# Patient Record
Sex: MALE | Race: AMERICAN INDIAN OR ALASKA NATIVE
[De-identification: names, ages, dates, MRNs, and addresses within clinical notes are randomized per-mention and may not be internally consistent; named-entity substitution may affect disease eponyms.]

---

## 2018-05-22 ENCOUNTER — HOSPITAL ENCOUNTER (EMERGENCY)
Dept: HOSPITAL 5 - ED | Age: 18
Discharge: LEFT BEFORE DISCHARGE | End: 2018-05-22
Payer: SELF-PAY

## 2018-05-22 VITALS — SYSTOLIC BLOOD PRESSURE: 115 MMHG | DIASTOLIC BLOOD PRESSURE: 80 MMHG

## 2018-05-22 DIAGNOSIS — Z53.21: ICD-10-CM

## 2018-05-22 DIAGNOSIS — R58: Primary | ICD-10-CM

## 2018-07-07 ENCOUNTER — HOSPITAL ENCOUNTER (EMERGENCY)
Dept: HOSPITAL 5 - ED | Age: 18
Discharge: HOME | End: 2018-07-07
Payer: SELF-PAY

## 2018-07-07 VITALS — DIASTOLIC BLOOD PRESSURE: 85 MMHG | SYSTOLIC BLOOD PRESSURE: 126 MMHG

## 2018-07-07 DIAGNOSIS — J01.10: Primary | ICD-10-CM

## 2018-07-07 PROCEDURE — 71046 X-RAY EXAM CHEST 2 VIEWS: CPT

## 2018-07-07 PROCEDURE — 99283 EMERGENCY DEPT VISIT LOW MDM: CPT

## 2018-07-07 NOTE — EMERGENCY DEPARTMENT REPORT
ED ENT HPI





- General


Chief complaint: Upper Respiratory Infection


Stated complaint: FLU LIKE SX


Time Seen by Provider: 18 04:21


Source: patient


Mode of arrival: Ambulatory


Limitations: No Limitations





- History of Present Illness


Initial comments: 


17-year-old male past medical history none presents with complaint of 2 weeks 

of persistent nasal congestion and sneezing intermittent headaches and slight 

malaise.  Patient is awake alert and oriented 3 fully lucid not in acute 

distress.  Denies current fevers or chills.  Denies any bloody cough.  Denies 

any recent travel.  Denies chest pain palpitations or significant shortness of 

breath.  States he was taking DayQuil NyQuil combination with minimal relief of 

his symptoms.  Has not used any other medicines other than Motrin.  Speaking in 

full sentences no audible wheezing or stridor.


MD complaint: other


Onset/Timin


-: week(s)


Location: nose


Severity: moderate


Severity scale (0 -10): 6


Quality: aching


Consistency: constant





- Related Data


 Previous Rx's











 Medication  Instructions  Recorded  Last Taken  Type


 


Amoxicillin/K Clav Tab [Augmentin 1 each PO ONCE #14 tablet 18 Unknown Rx





875MG TAB]    


 


Fluticasone [Flonase] 1 spray NS QDAY PRN #1 bottle 18 Unknown Rx


 


Ibuprofen [Motrin] 800 mg PO Q8HR PRN #20 tablet 18 Unknown Rx


 


Loratadine [Claritin] 10 mg PO ONCE PRN #30 tablet 18 Unknown Rx


 


Phenylephrine/Dm/Acetaminop/GG 10 ml PO Q6H PRN #1 liquid 18 Unknown Rx





[Mucinex Cold-Flu-Sorethroat Lq]    











 Allergies











Allergy/AdvReac Type Severity Reaction Status Date / Time


 


No Known Allergies Allergy   Unverified 18 02:42














ED Dental HPI





- General


Chief complaint: Upper Respiratory Infection


Stated complaint: FLU LIKE SX


Time Seen by Provider: 18 04:21


Source: patient


Mode of arrival: Ambulatory


Limitations: No Limitations





- Related Data


 Previous Rx's











 Medication  Instructions  Recorded  Last Taken  Type


 


Amoxicillin/K Clav Tab [Augmentin 1 each PO ONCE #14 tablet 18 Unknown Rx





875MG TAB]    


 


Fluticasone [Flonase] 1 spray NS QDAY PRN #1 bottle 18 Unknown Rx


 


Ibuprofen [Motrin] 800 mg PO Q8HR PRN #20 tablet 18 Unknown Rx


 


Loratadine [Claritin] 10 mg PO ONCE PRN #30 tablet 18 Unknown Rx


 


Phenylephrine/Dm/Acetaminop/GG 10 ml PO Q6H PRN #1 liquid 18 Unknown Rx





[Mucinex Cold-Flu-Sorethroat Lq]    











 Allergies











Allergy/AdvReac Type Severity Reaction Status Date / Time


 


No Known Allergies Allergy   Unverified 18 02:42














ED Review of Systems


ROS: 


Stated complaint: FLU LIKE SX


Other details as noted in HPI





Constitutional: denies: chills, fever


Eyes: denies: eye pain, eye discharge, vision change


ENT: congestion.  denies: ear pain, throat pain


Respiratory: denies: cough, shortness of breath, wheezing


Cardiovascular: denies: chest pain, palpitations


Endocrine: no symptoms reported


Gastrointestinal: denies: abdominal pain, nausea, diarrhea


Genitourinary: denies: urgency, dysuria


Musculoskeletal: denies: back pain, joint swelling, arthralgia


Skin: denies: rash, lesions


Neurological: denies: headache, weakness, paresthesias


Psychiatric: denies: anxiety, depression


Hematological/Lymphatic: denies: easy bleeding, easy bruising





ED Past Medical Hx





- Past Medical History


Previous Medical History?: No


Additional medical history: Keloids





- Surgical History


Past Surgical History?: No





- Social History


Smoking Status: Never Smoker


Substance Use Type: None





- Medications


Home Medications: 


 Home Medications











 Medication  Instructions  Recorded  Confirmed  Last Taken  Type


 


Amoxicillin/K Clav Tab [Augmentin 1 each PO ONCE #14 tablet 18  Unknown Rx





875MG TAB]     


 


Fluticasone [Flonase] 1 spray NS QDAY PRN #1 bottle 18  Unknown Rx


 


Ibuprofen [Motrin] 800 mg PO Q8HR PRN #20 tablet 18  Unknown Rx


 


Loratadine [Claritin] 10 mg PO ONCE PRN #30 tablet 18  Unknown Rx


 


Phenylephrine/Dm/Acetaminop/GG 10 ml PO Q6H PRN #1 liquid 18  Unknown Rx





[Mucinex Cold-Flu-Sorethroat Lq]     














ED Physical Exam





- General


Limitations: No Limitations


General appearance: alert, in no apparent distress





- Head


Head exam: Present: atraumatic, normocephalic





- Eye


Eye exam: Present: normal appearance, PERRL, EOMI





- ENT


ENT exam: Present: mucous membranes moist, other





- Expanded ENT Exam


  ** Expanded


Ear exam: Present: other (patient has nasal congestion on exam and some 

tenderness to palpation of sinuses)





- Neck


Neck exam: Present: normal inspection





- Respiratory


Respiratory exam: Present: normal lung sounds bilaterally.  Absent: respiratory 

distress





- Cardiovascular


Cardiovascular Exam: Present: regular rate, normal rhythm.  Absent: systolic 

murmur, diastolic murmur, rubs, gallop





- GI/Abdominal


GI/Abdominal exam: Present: soft, normal bowel sounds





- Rectal


Rectal exam: Present: deferred





- Extremities Exam


Extremities exam: Present: normal inspection





- Back Exam


Back exam: Present: normal inspection





- Neurological Exam


Neurological exam: Present: alert, oriented X3





- Psychiatric


Psychiatric exam: Present: normal affect, normal mood





- Skin


Skin exam: Present: warm, dry, intact, normal color.  Absent: rash





ED Course





 Vital Signs











  18





  02:15


 


Temperature 98.2 F


 


Pulse Rate 59 L


 


Respiratory 16





Rate 


 


Blood Pressure 126/85


 


O2 Sat by Pulse 100





Oximetry 














ED Medical Decision Making





- Medical Decision Making


A/P: Acute sinusitis


1- Based on clinical exam and symptoms and ongoing symptoms for 2 weeks will 

treat patient empirically for sinusitis


2- course of Augmentin twice a day 7 days, Motrin when necessary, Mucinex, 

Claritin, Flonase, throat lozenges


3- CXR is unremarkable, vital signs are stable patient is afebrile


4- follow-up with primary care


Critical care attestation.: 


If time is entered above; I have spent that time in minutes in the direct care 

of this critically ill patient, excluding procedure time.








ED Disposition


Clinical Impression: 


Sinusitis, acute


Qualifiers:


 Sinusitis location: frontal Recurrence: non-recurrent Qualified Code(s): 

J01.10 - Acute frontal sinusitis, unspecified





Disposition: DC-01 TO HOME OR SELFCARE


Is pt being admited?: No


Does the pt Need Aspirin: No


Condition: Stable


Instructions:  Sinusitis (ED)


Prescriptions: 


Amoxicillin/K Clav Tab [Augmentin 875MG TAB] 1 each PO ONCE #14 tablet


Fluticasone [Flonase] 1 spray NS QDAY PRN #1 bottle


 PRN Reason: Congestion


Ibuprofen [Motrin] 800 mg PO Q8HR PRN #20 tablet


 PRN Reason: Pain , Severe (7-10)


Loratadine [Claritin] 10 mg PO ONCE PRN #30 tablet


 PRN Reason: Congestion


Phenylephrine/Dm/Acetaminop/GG [Mucinex Cold-Flu-Sorethroat Lq] 10 ml PO Q6H 

PRN #1 liquid


 PRN Reason: Congestion


Referrals: 


[facility] [Provider Group] - 3-5 Days


[facility] [Provider Group] - 3-5 Days


Forms:  Work/School Release Form(ED)


Time of Disposition: 04:44

## 2018-07-07 NOTE — XRAY REPORT
FINAL REPORT



PROCEDURE:  XR CHEST ROUTINE 2V



TECHNIQUE:  PA and lateral chest radiographs were obtained. CPT

14450







HISTORY:  cough and congestion 



COMPARISON:  No prior studies are available for comparison.



FINDINGS:  

Heart: Normal.



Mediastinum/Vessels: Normal.



Lungs/Pleural space: Normal.



Bony thorax: No acute osseous abnormality.



Other: 



IMPRESSION:  

Normal examination.

## 2018-07-29 ENCOUNTER — HOSPITAL ENCOUNTER (EMERGENCY)
Dept: HOSPITAL 5 - ED | Age: 18
Discharge: HOME | End: 2018-07-29
Payer: COMMERCIAL

## 2018-07-29 VITALS — SYSTOLIC BLOOD PRESSURE: 117 MMHG | DIASTOLIC BLOOD PRESSURE: 76 MMHG

## 2018-07-29 DIAGNOSIS — F17.200: ICD-10-CM

## 2018-07-29 DIAGNOSIS — G51.0: Primary | ICD-10-CM

## 2018-07-29 DIAGNOSIS — L91.0: ICD-10-CM

## 2018-07-29 PROCEDURE — 96374 THER/PROPH/DIAG INJ IV PUSH: CPT

## 2018-07-29 PROCEDURE — 99284 EMERGENCY DEPT VISIT MOD MDM: CPT

## 2018-07-29 PROCEDURE — 70450 CT HEAD/BRAIN W/O DYE: CPT

## 2018-07-29 NOTE — EMERGENCY DEPARTMENT REPORT
ED Neuro Deficit HPI





- General


Chief Complaint: Eye Problems


Stated Complaint: NUM ON LFT SIDE/PINK EYE


Time Seen by Provider: 18 13:08


Source: patient


Mode of arrival: Ambulatory


Limitations: No Limitations





- History of Present Illness


Initial Comments: 





This is a 17-year-old male presented to the emergency room reported that his 

left eye is watery and red since yesterday.  He is reporting facial numbness 

and inability to open his mouth because of facial numbness.  He denies any 

headache, blurred vision, dizziness or numbness or tingling to extremities.  

Denies any chest pain or shortness of breath.  Pain is 0 out of 10.  Denies any 

trauma


Onset/Timin


-: days(s)


Location: left face


Presenting Symptoms: Present: Facial Droop/Numbness


History of same: No


Place: home


Severity: moderate


Quality: numb, constant


Improves With: none


Worsens With: none


On Anticoagulants: No


Context: sudden onset


Associated Symptoms: denies: confusion, chest pain, cough, diaphoresis, fever/

chills, headaches, loss of appetite, malise, nausea/vomiting, vertigo, seizures

, shortness of breath, syncope, weakness


Treatments Prior to Arrival: none





- Related Data


Home Medications: 


 Previous Rx's











 Medication  Instructions  Recorded  Last Taken  Type


 


Amoxicillin/K Clav Tab [Augmentin 1 tab PO Q12HR #14 tab 18 Unknown Rx





875 mg]    


 


Fluticasone [Flonase] 1 spray NS QDAY PRN #1 bottle 18 Unknown Rx


 


Ibuprofen [Motrin] 800 mg PO Q8HR PRN #20 tablet 18 Unknown Rx


 


Loratadine [Claritin] 10 mg PO ONCE PRN #30 tablet 18 Unknown Rx


 


Phenylephrine/Dm/Acetaminop/GG 10 ml PO Q6H PRN #1 liquid 18 Unknown Rx





[Mucinex Cold-Flu-Sorethroat Lq]    


 


Polyvinyl Alcohol [Artificial 2 drop OS Q2H #15 drops 18 Unknown Rx





Tears]    


 


Prednisone [predniSONE 10 mg 10 mg PO .TAPER #1 tab.ds.pk 18 Unknown Rx





(6-Day Pack, 21 Tabs)]    


 


Valacyclovir HCl [Valtrex] 1,000 mg PO TID 7 Days #21 tablet 18 Unknown Rx











Allergies/Adverse Reactions: 


 Allergies











Allergy/AdvReac Type Severity Reaction Status Date / Time


 


No Known Allergies Allergy   Unverified 18 02:42














ED Review of Systems


ROS: 


Stated complaint: NUM ON LFT SIDE/PINK EYE


Other details as noted in HPI





Constitutional: denies: chills, fever


Eyes: other (unable to close left eye fully.  Increased tearing to the left eye)

.  denies: eye pain, eye discharge, vision change


ENT: other (facial numbness).  denies: throat pain


Respiratory: denies: cough, shortness of breath, SOB with exertion, SOB at rest

, stridor, wheezing


Cardiovascular: denies: chest pain, palpitations, dyspnea on exertion


Gastrointestinal: denies: abdominal pain, nausea, vomiting, diarrhea


Genitourinary: denies: urgency, dysuria


Musculoskeletal: denies: back pain, joint swelling, arthralgia, myalgia


Skin: denies: rash, lesions


Neurological: numbness.  denies: headache, weakness, paresthesias, confusion, 

abnormal gait, vertigo





ED Past Medical Hx





- Past Medical History


Previous Medical History?: No


Additional medical history: Keloids





- Surgical History


Past Surgical History?: No





- Family History


Family history: no significant





- Social History


Smoking Status: Current Every Day Smoker


Substance Use Type: None





- Medications


Home Medications: 


 Home Medications











 Medication  Instructions  Recorded  Confirmed  Last Taken  Type


 


Amoxicillin/K Clav Tab [Augmentin 1 tab PO Q12HR #14 tab 18  Unknown Rx





875 mg]     


 


Fluticasone [Flonase] 1 spray NS QDAY PRN #1 bottle 18  Unknown Rx


 


Ibuprofen [Motrin] 800 mg PO Q8HR PRN #20 tablet 18  Unknown Rx


 


Loratadine [Claritin] 10 mg PO ONCE PRN #30 tablet 18  Unknown Rx


 


Phenylephrine/Dm/Acetaminop/GG 10 ml PO Q6H PRN #1 liquid 18  Unknown Rx





[Mucinex Cold-Flu-Sorethroat Lq]     


 


Polyvinyl Alcohol [Artificial 2 drop OS Q2H #15 drops 18  Unknown Rx





Tears]     


 


Prednisone [predniSONE 10 mg 10 mg PO .TAPER #1 tab.ds.pk 18  Unknown Rx





(6-Day Pack, 21 Tabs)]     


 


Valacyclovir HCl [Valtrex] 1,000 mg PO TID 7 Days #21 tablet 18  Unknown 

Rx














ED Neuro Physical Exam





- General


Limitations: No Limitations


General appearance: alert, in no apparent distress


Suspected Stroke: No





- Head


Head exam: Present: atraumatic, normocephalic, normal inspection, other (normal 

exam)





- Eye


Eye exam: Present: normal appearance, PERRL, EOMI.  Absent: periorbital swelling

, periorbital tenderness


Pupils: Present: normal accommodation





- Expanded Eye Exam


  ** Expanded


Eyelids: Normal Inspection: Right (left eyelid droop.)


Pupils: Regular, Round: Bilateral, Reactive: Bilateral


Sclera/Conjunctival: Normal Inspection: Bilateral (increased tearing to left eye

)


Visual acuity (R) = 20/: 15 (20/15 all around)


Visual acuity (L) = 20/: 15





- ENT


ENT exam: Present: normal exam, normal orophraynx, mucous membranes moist, TM's 

normal bilaterally, normal external ear exam





- Neck


Neck exam: Present: normal inspection, tenderness, meningismus, full ROM, other 

(no C-spine tenderness).  Absent: lymphadenopathy





- Respiratory


Respiratory exam: Present: normal lung sounds bilaterally.  Absent: respiratory 

distress, chest wall tenderness





- Cardiovascular


Cardiovascular Exam: Present: regular rate, normal rhythm, normal heart sounds.

  Absent: systolic murmur, diastolic murmur





- GI/Abdominal


GI/Abdominal exam: Present: soft, normal bowel sounds.  Absent: distended, 

tenderness, rigid





- Extremities Exam


Extremities exam: Present: normal inspection, full ROM, normal capillary refill

, other (No cce. + 2 pulses in all extremities, no neurovascular compromise).  

Absent: tenderness, pedal edema, joint swelling, calf tenderness





- Back Exam


Back exam: Present: normal inspection, full ROM, other (ambulates without any 

difficulties).  Absent: tenderness, CVA tenderness (R), CVA tenderness (L), 

muscle spasm, paraspinal tenderness, vertebral tenderness, rash noted





- Neurological Exam


Neurological exam: Present: alert, oriented X3, normal gait, reflexes normal, 

other (left facial paralysis.  Left eyebrow sagging, inability to close left eye

, ).  Absent: motor sensory deficit (left facial muscle paralysis)





- NIHSS


5a. Motor Arm Left: no drift


6a. Motor Leg Left: no drift





- Psychiatric


Psychiatric exam: Present: normal affect, normal mood





- Skin


Skin exam: Present: warm, dry, intact, normal color.  Absent: rash





ED Course


 Vital Signs











  18





  12:00


 


Temperature 98.2 F


 


Pulse Rate 74


 


Respiratory 16





Rate 


 


Blood Pressure 123/72


 


O2 Sat by Pulse 100





Oximetry 














- Reevaluation(s)


Reevaluation #1: 





18 15:16


Patient given Timentin 125 mg IV and acyclovir 800 mg.  Emergency room





- Radiology Data


Radiology results: report reviewed


CT scan of the head and brain without contrast dictated radiologist report 

reviewed by myself.  No acute findings. See report below.





Patient: KWESI STREETER MR#: [id number] 


: 2000 Acct:[id number] 


Age/Sex: 17 / M ADM Date: 18 


Loc: ED 


Attending Dr: 








Ordering Physician: JOSE MANUEL PACHECO 


Date of Service: 18 


Procedure(s): CT head/brain wo con 


Accession Number(s): [id number] 





cc: JOSE MANUEL PACHECO 








FINAL REPORT 





PROCEDURE: CT HEAD/BRAIN WO CON 





TECHNIQUE: Computerized tomography of the head was performed 


without contrast material. 





HISTORY: facial drooping, 





COMPARISON: No prior studies are available for comparison. 





FINDINGS: 


Brain: Brain density appears normal. No evidence of intracranial 


hemorrhage. No parenchymal hemorrhage, mass lesions or mass 


effect are seen. No abnormal extraxial fluid collects or masses 


are seen. 





Ventricles: Ventricles are normal size and are midline. 





Bone Windows: No evidence of skull fracture. 





Paranasal sinuses: Visualized portions are clear. 





Mastoid air cells: Clear 





IMPRESSION: 


Negative exam. 











Transcribed By: DFN 


Dictated By: SIMBA LUTZ MD 


Electronically Authenticated By: SIMBA LUTZ MD 


Signed Date/Time: 18 











DD/DT: 18 


TD/TT: 18





- Medical Decision Making





This is a 17-year-old male who presents to the Hospital complaining of left 

face droop.  With his lips slanted in and increased tearing to the left eye 

with inability to close left eye.  He is reporting numbness to the left side of 

his face with an inability to move left side of face.  Denies any injury or any 

previous incident.





She was seen by myself and also by Dr. Chavez.  I examined patient and he was 

found to have left facial nerve paralysis, left eyebrow droop in, inability to 

close left eye, droop into the left side of his mouth and increased tearing to 

left eye.  He has no pain.  Physical findings for peripheral nerve involvement.

  Patient had CT scan of the brain and head without contrast that shows normal 

exam.  CVA ruled out, otitis media ruled out.  I discussed diagnosis and 

treatment plan the patient and he voiced understanding.  Patient was given 

steroids and antiviral in emergency room with no adverse reaction.  I discussed 

with him he needs to follow up with primary care physician and neurologist 

within 1-2 days for follow-up Bell's palsy.





Assessment/plan





Bell's palsy-CT scan of the head revealed no acute findings.  Patient was given 

acyclovir 800 mg by mouth and Solu-Medrol 125 mg IV.  He will be discharged 

home on Zovirax and prednisone Dosepak,artificial tears





I educated patient on diagnosis, CT scan results, medication, allergies are 

given on Bell's palsy and need to follow-up and he voiced understanding.





Patient discharged home in stable condition, vital signs are stable he is 

afebrile and is nontoxic in appearance.  Discharged home to follow-up with his 

primary care and neurology in 1-2 days and he voiced understanding.  

Prescription given for Zovirax and prednisone Dosepak, artificial tears





- Differential Diagnosis


intracranial abnormality, viral infection, OM,Bell's palsy





- Thrombolytic Inclusion/Exclusion


Thrombolytic Exclusion Criteria: Symptom Onset > 3 Hours


Critical care attestation.: 


If time is entered above; I have spent that time in minutes in the direct care 

of this critically ill patient, excluding procedure time.








ED Disposition


Clinical Impression: 


 Facial paralysis/[place] palsy





Disposition: DC-01 TO HOME OR SELFCARE


Is pt being admited?: No


Does the pt Need Aspirin: No


Condition: Stable


Instructions:  Xiong Palsy (ED), Eye Lubricant (Into the eye)


Additional Instructions: 


Please apply artificial tears to left eye every 2 hours while awake.  This will 

prevent drying out of left eye


Please follow up with neurologist and primary care in 1-2 days as instructed


take medication as instructed


If Condition and symptoms worsens please return to the emergency room


The patient education given to you on Bell's palsy


Prescriptions: 


Polyvinyl Alcohol [Artificial Tears] 2 drop OS Q2H #15 drops


Prednisone [predniSONE 10 mg (6-Day Pack, 21 Tabs)] 10 mg PO .TAPER #1 tab.ds.pk


Valacyclovir HCl [Valtrex] 1,000 mg PO TID 7 Days #21 tablet


Referrals: 


PRIMARY CARE,MD [Primary Care Provider] - 18


[facility] [Outside] - 18


BRIDGET MARIE MD [Staff Physician] - 2-3 Days


Forms:  Work/School Release Form(ED)

## 2018-07-29 NOTE — CAT SCAN REPORT
FINAL REPORT



PROCEDURE:  CT HEAD/BRAIN WO CON



TECHNIQUE:  Computerized tomography of the head was performed

without contrast material. 



HISTORY:  facial drooping, 



COMPARISON:  No prior studies are available for comparison.



FINDINGS:  

Brain: Brain density appears normal.  No evidence of intracranial

hemorrhage.  No parenchymal hemorrhage, mass lesions or mass

effect are seen. No abnormal extraxial fluid collects or masses

are seen.



Ventricles: Ventricles are normal size and are midline. 



Bone Windows:  No evidence of skull fracture.



Paranasal sinuses: Visualized portions are clear.



Mastoid air cells: Clear



IMPRESSION:  

Negative exam.

## 2018-08-12 ENCOUNTER — HOSPITAL ENCOUNTER (EMERGENCY)
Dept: HOSPITAL 5 - ED | Age: 18
End: 2018-08-12
Payer: SELF-PAY

## 2018-08-12 VITALS — DIASTOLIC BLOOD PRESSURE: 64 MMHG | SYSTOLIC BLOOD PRESSURE: 116 MMHG

## 2018-08-12 DIAGNOSIS — Z53.21: ICD-10-CM

## 2018-08-12 DIAGNOSIS — R22.0: Primary | ICD-10-CM

## 2018-08-12 DIAGNOSIS — R20.0: ICD-10-CM
